# Patient Record
Sex: MALE | Race: BLACK OR AFRICAN AMERICAN | NOT HISPANIC OR LATINO | URBAN - NONMETROPOLITAN AREA
[De-identification: names, ages, dates, MRNs, and addresses within clinical notes are randomized per-mention and may not be internally consistent; named-entity substitution may affect disease eponyms.]

---

## 2019-04-08 ENCOUNTER — IMPORTED ENCOUNTER (OUTPATIENT)
Dept: URBAN - NONMETROPOLITAN AREA CLINIC 1 | Facility: CLINIC | Age: 34
End: 2019-04-08

## 2019-04-08 PROBLEM — H52.223: Noted: 2019-04-08

## 2019-04-08 PROCEDURE — 92004 COMPRE OPH EXAM NEW PT 1/>: CPT

## 2019-04-08 PROCEDURE — 92015 DETERMINE REFRACTIVE STATE: CPT

## 2019-04-08 NOTE — PATIENT DISCUSSION
Astigmatism OUDiscussed refractive status with patientNew glasses Rx given todayContinue to monitor; 's Notes: MR 4/8/19DFE 4/8/19

## 2021-10-19 NOTE — PATIENT DISCUSSION
Recommend excision of lesion, left lower eyelid at time of blepharoplasty at [facility].

## 2021-10-19 NOTE — PATIENT DISCUSSION
SIGNIFICANT DERMATOCHALASIS, OU; RECOMMEND UPPER EYELID BLEPHAROPLASTY, OU. Discussed risks and benefits of procedure with patient in great detail, she understands and will schedule at her convenience.

## 2022-02-07 NOTE — PATIENT DISCUSSION
Patient is here for suture removal.  The patient has a normal amount of bruising and swelling consistent with the post operative course.  The suture lines are intact, there is no evidence of infection.  The plan is to remove the sutures today with an expectation of normal healing.  The post op course has been further reviewed with the patient. Sutures removed today without difficulty. Resume normal activity.  Resume any medications that were discontinued for surgery. Stop cold compresses Hot compresses to affected lid(s) 2-3 times daily for one month, 5 minutes at a time.  Artificial tears prn burning/itching/blurry vision. Wash incisions/ lash line once daily with baby shampoo. Resolved.

## 2022-04-09 ASSESSMENT — TONOMETRY
OS_IOP_MMHG: 15
OD_IOP_MMHG: 14

## 2022-04-09 ASSESSMENT — VISUAL ACUITY
OD_CC: 20/25-
OS_CC: 20/25-

## 2024-06-24 ENCOUNTER — EMERGENCY VISIT (OUTPATIENT)
Dept: URBAN - NONMETROPOLITAN AREA CLINIC 1 | Facility: CLINIC | Age: 39
End: 2024-06-24

## 2024-06-24 DIAGNOSIS — T15.11XA: ICD-10-CM

## 2024-06-24 DIAGNOSIS — S05.8X1A: ICD-10-CM

## 2024-06-24 DIAGNOSIS — H35.81: ICD-10-CM

## 2024-06-24 PROCEDURE — 65210 REMOVE FOREIGN BODY FROM EYE: CPT

## 2024-06-24 PROCEDURE — 92250 FUNDUS PHOTOGRAPHY W/I&R: CPT

## 2024-06-24 PROCEDURE — 99204 OFFICE O/P NEW MOD 45 MIN: CPT

## 2024-06-24 RX ORDER — CIPROFLOXACIN 3 MG/ML: 1 SOLUTION OPHTHALMIC

## 2024-06-24 ASSESSMENT — VISUAL ACUITY
OD_PH: 20/20
OS_SC: 20/20
OD_SC: 20/50

## 2024-07-22 ENCOUNTER — FOLLOW UP (OUTPATIENT)
Dept: URBAN - NONMETROPOLITAN AREA CLINIC 1 | Facility: CLINIC | Age: 39
End: 2024-07-22

## 2024-07-22 DIAGNOSIS — H35.81: ICD-10-CM

## 2024-07-22 DIAGNOSIS — S05.8X1D: ICD-10-CM

## 2024-07-22 PROCEDURE — 99213 OFFICE O/P EST LOW 20 MIN: CPT

## 2024-07-22 ASSESSMENT — VISUAL ACUITY
OD_PH: 20/20
OD_SC: 20/40
OS_SC: 20/30
OU_SC: 20/20

## 2024-07-22 ASSESSMENT — TONOMETRY
OD_IOP_MMHG: 18
OS_IOP_MMHG: 18